# Patient Record
Sex: MALE | Race: WHITE | URBAN - METROPOLITAN AREA
[De-identification: names, ages, dates, MRNs, and addresses within clinical notes are randomized per-mention and may not be internally consistent; named-entity substitution may affect disease eponyms.]

---

## 2018-09-17 ENCOUNTER — OFFICE VISIT (OUTPATIENT)
Dept: OTHER | Facility: OUTPATIENT CENTER | Age: 58
End: 2018-09-17
Payer: COMMERCIAL

## 2018-09-17 ENCOUNTER — TELEPHONE (OUTPATIENT)
Dept: OTHER | Facility: OUTPATIENT CENTER | Age: 58
End: 2018-09-17

## 2018-09-17 DIAGNOSIS — F41.1 GENERALIZED ANXIETY DISORDER: ICD-10-CM

## 2018-09-17 DIAGNOSIS — F10.20 ALCOHOL USE DISORDER, SEVERE, DEPENDENCE (H): ICD-10-CM

## 2018-09-17 DIAGNOSIS — F33.2 SEVERE EPISODE OF RECURRENT MAJOR DEPRESSIVE DISORDER, WITHOUT PSYCHOTIC FEATURES (H): ICD-10-CM

## 2018-09-17 DIAGNOSIS — F64.0 GENDER DYSPHORIA IN ADULT: Primary | ICD-10-CM

## 2018-09-17 ASSESSMENT — ANXIETY QUESTIONNAIRES
7. FEELING AFRAID AS IF SOMETHING AWFUL MIGHT HAPPEN: NOT AT ALL
6. BECOMING EASILY ANNOYED OR IRRITABLE: NEARLY EVERY DAY
1. FEELING NERVOUS, ANXIOUS, OR ON EDGE: NEARLY EVERY DAY
3. WORRYING TOO MUCH ABOUT DIFFERENT THINGS: NEARLY EVERY DAY
GAD7 TOTAL SCORE: 18
5. BEING SO RESTLESS THAT IT IS HARD TO SIT STILL: NEARLY EVERY DAY
2. NOT BEING ABLE TO STOP OR CONTROL WORRYING: NEARLY EVERY DAY

## 2018-09-17 ASSESSMENT — PATIENT HEALTH QUESTIONNAIRE - PHQ9: 5. POOR APPETITE OR OVEREATING: NEARLY EVERY DAY
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
## 2018-09-17 NOTE — MR AVS SNAPSHOT
After Visit Summary   9/17/2018    Kan Williamson    MRN: [id number]           Patient Information     Date Of Birth          1960        Visit Information        Provider Department      9/17/2018 2:30 PM Dimitry Real PhD [facility]        Today's Diagnoses     Gender dysphoria in adult    -  1    Generalized anxiety disorder        Severe episode of recurrent major depressive disorder, without psychotic features (H)        Alcohol use disorder, severe, dependence (H)           Follow-ups after your visit        Your next 10 appointments already scheduled     Oct 01, 2018  3:30 PM CDT   Individual Therapy 53+ minutes with Dimitry Real PhD   [facility] ([facility])    [address]  [address]  [facility] [address]   [phone]            Oct 15, 2018  3:15 PM CDT   Individual Therapy 53+ minutes with Dimitry Rael PhD   [facility] ([facility])    [address]  [address]  [facility] [address]   [phone]            Oct 29, 2018  4:15 PM CDT   Individual Therapy 53+ minutes with Dimitry Real PhD   [facility] ([facility])    [address]  [address]  [facility] [address]   [phone]              Who to contact     Please call your clinic at [phone] to:    Ask questions about your health    Make or cancel appointments    Discuss your medicines    Learn about your test results    Speak to your doctor            Additional Information About Your Visit        MyChart Information     [employer] is an electronic gateway that provides easy, online access to your medical records. With [employer], you can request a clinic appointment, read your test results, renew a prescription or communicate with your care team.     To sign up for [employer] visit the website at [email or web address]   You will be asked to enter the access code listed below, as well as some  personal information. Please follow the directions to create your username and password.     Your access code is: [email or web address]  Expires: 2018 12:21 PM     Your access code will  in 90 days. If you need help or a new code, please contact your [facility] or call [phone] for assistance.        Care EveryWhere ID     This is your Care EveryWhere ID. This could be used by other organizations to access your [facility] medical records  [id number]         Blood Pressure from Last 3 Encounters:   No data found for BP    Weight from Last 3 Encounters:   No data found for Wt              We Performed the Following     C PSYCHIATRIC DIAGNOSTIC EVALUATION        Primary Care Provider    None Specified       No primary provider on file.        Equal Access to Services     ZAIDA ALCANTAR : Suzy Mon, lincoln rahman, gabriel hernandez, tamika de leon . So [facility] [phone].    ATENCIÓN: Si habla español, tiene a rodriguez disposición servicios gratuitos de asistencia lingüística. Llame al [phone].    We comply with applicable federal civil rights laws and [place] laws. We do not discriminate on the basis of race, color, national origin, age, disability, sex, sexual orientation, or gender identity.            Thank you!     Thank you for choosing [facility]  for your care. Our goal is always to provide you with excellent care. Hearing back from our patients is one way we can continue to improve our services. Please take a few minutes to complete the written survey that you may receive in the mail after your visit with us. Thank you!             Your Updated Medication List - Protect others around you: Learn how to safely use, store and throw away your medicines at www.disposemymeds.org.      Notice  As of 2018 11:59 PM    You have not been prescribed any medications.

## 2018-09-18 PROBLEM — F33.2 SEVERE EPISODE OF RECURRENT MAJOR DEPRESSIVE DISORDER (H): Status: ACTIVE | Noted: 2018-09-18

## 2018-09-20 ASSESSMENT — ANXIETY QUESTIONNAIRES: GAD7 TOTAL SCORE: 18

## 2018-09-20 ASSESSMENT — PATIENT HEALTH QUESTIONNAIRE - PHQ9: SUM OF ALL RESPONSES TO PHQ QUESTIONS 1-9: 23

## 2018-09-22 PROBLEM — F64.0 GENDER DYSPHORIA IN ADULT: Status: ACTIVE | Noted: 2018-09-22

## 2018-09-22 PROBLEM — F41.1 GENERALIZED ANXIETY DISORDER: Status: ACTIVE | Noted: 2018-09-22

## 2018-09-22 PROBLEM — F10.20 ALCOHOL USE DISORDER, SEVERE, DEPENDENCE (H): Status: ACTIVE | Noted: 2018-09-22

## 2018-10-01 ENCOUNTER — OFFICE VISIT (OUTPATIENT)
Dept: OTHER | Facility: OUTPATIENT CENTER | Age: 58
End: 2018-10-01
Payer: COMMERCIAL

## 2018-10-01 DIAGNOSIS — F64.0 GENDER DYSPHORIA IN ADULT: Primary | ICD-10-CM

## 2018-10-01 DIAGNOSIS — F10.20 ALCOHOL USE DISORDER, SEVERE, DEPENDENCE (H): ICD-10-CM

## 2018-10-01 DIAGNOSIS — F41.1 GENERALIZED ANXIETY DISORDER: ICD-10-CM

## 2018-10-01 DIAGNOSIS — F33.2 SEVERE EPISODE OF RECURRENT MAJOR DEPRESSIVE DISORDER, WITHOUT PSYCHOTIC FEATURES (H): ICD-10-CM

## 2018-10-01 NOTE — MR AVS SNAPSHOT
After Visit Summary   10/1/2018    Kan Williamson    MRN: [id number]           Patient Information     Date Of Birth          1960        Visit Information        Provider Department      10/1/2018 3:30 PM Dimitry Real PhD [facility]        Today's Diagnoses     Gender dysphoria in adult    -  1    Generalized anxiety disorder        Alcohol use disorder, severe, dependence (H)        Severe episode of recurrent major depressive disorder, without psychotic features (H)           Follow-ups after your visit        Your next 10 appointments already scheduled     Oct 15, 2018  3:15 PM CDT   Individual Therapy 53+ minutes with Dimitry Real PhD   [facility] ([facility])    [address]  [address]  [facility] [address]   [phone]            Oct 29, 2018  4:15 PM CDT   Individual Therapy 53+ minutes with Dimitry Real PhD   [facility] ([facility])    [address]  [address]  [facility] [address]   [phone]            Nov 13, 2018  3:00 PM CST   Individual Therapy 53+ minutes with Dimitry Real PhD   [facility] ([facility])    [address]  [address]  [facility] [address]   [phone]            Nov 27, 2018  3:00 PM CST   Individual Therapy 53+ minutes with Dimitry Real PhD   [facility] ([facility])    [address]  [address]  [facility] [address]   [phone]              Who to contact     Please call your clinic at [phone] to:    Ask questions about your health    Make or cancel appointments    Discuss your medicines    Learn about your test results    Speak to your doctor            Additional Information About Your Visit        [employer] Information     [employer] is an electronic gateway that provides easy, online access to your medical records. With [employer], you can request a clinic appointment, read  your test results, renew a prescription or communicate with your care team.     To sign up for Kanbanizehart visit the website at [email or web address]   You will be asked to enter the access code listed below, as well as some personal information. Please follow the directions to create your username and password.     Your access code is: [email or web address]  Expires: 2018 12:21 PM     Your access code will  in 90 days. If you need help or a new code, please contact your [facility] or call [phone] for assistance.        Care EveryWhere ID     This is your Care EveryWhere ID. This could be used by other organizations to access your [place] [facility] medical records  [id number]         Blood Pressure from Last 3 Encounters:   No data found for BP    Weight from Last 3 Encounters:   No data found for Wt              We Performed the Following     Diagnostic Assessment (complete) [37530]        Primary Care Provider    None Specified       No primary provider on file.        Equal Access to Services     [facility] CenterAM[facility]O : Hadii faustino cadeto Sokatie, waaxda luserena, qaybta kaalmada david, tamika de leon . So [facility] [phone].    ATENCIÓN: Si habla español, tiene a rodriguez disposición servicios gratuitos de asistencia lingüística. Llame al [phone].    We comply with applicable federal civil rights laws and [place] laws. We do not discriminate on the basis of race, color, national origin, age, disability, sex, sexual orientation, or gender identity.            Thank you!     Thank you for choosing [place] [facility]  for your care. Our goal is always to provide you with excellent care. Hearing back from our patients is one way we can continue to improve our services. Please take a few minutes to complete the written survey that you may receive in the mail after your visit with us. Thank you!             Your Updated Medication List - Protect others  around you: Learn how to safely use, store and throw away your medicines at www.disposemymeds.org.      Notice  As of 10/1/2018 11:59 PM    You have not been prescribed any medications.

## 2018-10-01 NOTE — PROGRESS NOTES
"[facility] -  Case Progress Note    Date of Service: Oct 1, 2018  Client Name: Kan Quinn)  YOB: 1960  MRN:  [id number]  Treating Provider: Dimitry Real, PhD  Type of Session: Individual  Present in Session: Patient only  Number of Minutes:  53    Current Symptoms/Status:  The client meets the criteria for Gender Dysphoria (GD), which includes cross gender identification and substantial discomfort with biological sex (anatomical dysphoria) and the expectation of the birth assigend gender role for a period greater than 6 months.  There does not appear to be any evidence that the gender identity concerns are motivated by fetishistic or compulsive characteristics.    The client further meets the criteria for Alcohol Use Disorder (Severe), although she reported significantly less alcohol usage over the last two weeks. Meets criteria for Generalized Anxiety Disorder and Major Depressive Disorder, Recurrent, Severe.    Progress Toward Treatment Goals:   The client continues to show interest and commitment towards receiving services at [facility].   The client is concerned about social transition, particularly in her workplace.   The client identified two treatment goals to work toward.    Intervention: Modality and Response:  The client arrived to session 30 minutes early. Client was oriented in person, place, and time. Client denied suicidal and homicidal ideation during treatment planning. Client's identified treatment goals include: 1) Decrease alcohol and cigarette use, and 2) Explore possibility of hormone replacement therapy (HRT). Client reported consuming four alcoholic drinks since our last session. Client reported smoking one pack of cigarettes every other day. Client remains open and motivated for further decreasing substance use in order to be a more attractive candidate for HRT. Client shared feeling \"at peace\" with her present life but reported occasionally experiencing " "depressive sxs for waiting \"this long\" to begin her transition. Client shared having a supportive social network consisting of members in her LGBTQ IOP group.    Assignment:  -Continue decreasing alcohol and cigarette use.  -Engage in self care.    Interactive Complexity:  NA    Diagnosis:  302.85/F64.0 Gender Dysphoria  303.90/F10.20 Alcohol Use Disorder, Severe  296.33/F33.2 Major Depressive Disorder, Recurrent episode, Severe  300.02/F41.1 Generalized Anxiety Disorder    Plan / Need for Future Services:  Return for therapy in 2 weeks.      Dimitry Real, PhD  "

## 2018-10-02 NOTE — PROGRESS NOTES
I did not personally see the patient but I have reviewed and agree with the assessment and plan as documented in this note.  Hien Bassett, PhD -- Supervisor   Licensed Psychologist

## 2018-10-09 ENCOUNTER — TELEPHONE (OUTPATIENT)
Dept: OTHER | Facility: OUTPATIENT CENTER | Age: 58
End: 2018-10-09

## 2018-10-09 NOTE — TELEPHONE ENCOUNTER
Telephone Intake--TG Adult  Date: 10/9/2018  Client Name:  Kan  Preferred Name: Ashwini   MRN: [id number]  : 1960     Age:  58  Caller Name:   Relationship to Client:  Referral: Prabhakar Real      Presenting Problem / Reason for Assessment   (Clinical History &Symptoms):     MTF  Goals: to have breast, voice, body shape  Family Support: Positive (wife) other family members unaware  Dresses in home female      Clarify their goals/expected services from [facility] medical care--what are they looking for?    Clarify with patient (as necessary) that we are not a primary care clinic and that we do not have a surgeon. We strongly recommend that patients have a primary care doctor for their overall health needs, and we can refer to primary care clinics. We can assist with surgery referrals.  Possible      Length of time experiencing symptoms:  Since the age of 12      Seen Other Providers for Gender (if so, where):    M.D/other.(hormones) :  No  --If yes, request records from the provider at the 1st session.    Therapist:  Prabhakar Real  --if yes, request a referral or summary letter from the therapist at the 1st session..    Psychiatrist:  No  --if yes,, request records from the provider at the 1st session..      Other Medical/Mental Health Diagnoses:  Gender Dysphoria, Open Heart Surgery 2017          If needed, clarify if patient calling from group home or other supervised living arrangement    Note if patient has no mental health or cognitive diagnosis, but phone behavior suggests impairment      Medications:  Aspirin, Atrovastin, Potassium, Plavix, metoprolol, lasix        Primary Care Provider: NO  --If multiple medical conditions, request recent records from primary doctor at the 1st session..      Referral Source:        Follow Up:        Please Verify Registration    If patient has LiveOps or Hotreader, send to .     Paper work sent 10/9  Appt 10/16    Prep Welcome Packet and have patient  come half hour beforehand to fill out forms in packet (health history form, transgender history, Safety Screening, PHQ9, and JANUARY-7.

## 2018-10-16 ENCOUNTER — OFFICE VISIT (OUTPATIENT)
Dept: OTHER | Facility: OUTPATIENT CENTER | Age: 58
End: 2018-10-16
Payer: COMMERCIAL

## 2018-10-16 VITALS
DIASTOLIC BLOOD PRESSURE: 96 MMHG | WEIGHT: 147 LBS | HEART RATE: 101 BPM | SYSTOLIC BLOOD PRESSURE: 148 MMHG | BODY MASS INDEX: 23.07 KG/M2 | HEIGHT: 67 IN

## 2018-10-16 DIAGNOSIS — F17.200 SMOKING: ICD-10-CM

## 2018-10-16 DIAGNOSIS — I50.22 CHRONIC SYSTOLIC HEART FAILURE (H): ICD-10-CM

## 2018-10-16 DIAGNOSIS — I10 BENIGN ESSENTIAL HYPERTENSION: ICD-10-CM

## 2018-10-16 DIAGNOSIS — E78.00 HYPERCHOLESTEREMIA: ICD-10-CM

## 2018-10-16 DIAGNOSIS — I25.810 CORONARY ARTERY DISEASE INVOLVING CORONARY BYPASS GRAFT OF NATIVE HEART WITHOUT ANGINA PECTORIS: ICD-10-CM

## 2018-10-16 NOTE — MR AVS SNAPSHOT
After Visit Summary   10/16/2018    Kan Williamson    MRN: [id number]           Patient Information     Date Of Birth          1960        Visit Information        Provider Department      10/16/2018 11:20 AM Tyler Reyes MD [facility]        Today's Diagnoses     Coronary artery disease involving coronary bypass graft of native heart without angina pectoris        Chronic systolic heart failure (H)        Benign essential hypertension        Hypercholesteremia        Smoking           Follow-ups after your visit        Follow-up notes from your care team     Return if symptoms worsen or fail to improve.      Your next 10 appointments already scheduled     Oct 29, 2018  4:15 PM CDT   Individual Therapy 53+ minutes with Dimitry Real PhD   [facility] ([facility])    [address]  [address]  [facility] [address]   [phone]            Nov 13, 2018  3:00 PM CST   Individual Therapy 53+ minutes with Dimitry Real PhD   [facility] ([facility])    [address]  [address]  [facility] [address]   [phone]            Nov 27, 2018  3:00 PM CST   Individual Therapy 53+ minutes with Dimitry Real PhD   [place] [facility] ([facility])    [address]  [address]  [facility] [address]   [phone]              Who to contact     Please call your clinic at [phone] to:    Ask questions about your health    Make or cancel appointments    Discuss your medicines    Learn about your test results    Speak to your doctor            Additional Information About Your Visit        MyChart Information     [employer] [facility] is an electronic gateway that provides easy, online access to your medical records. With [facility], you can request a clinic appointment, read your test results, renew a prescription or communicate with your care team.     To sign up for [employer] visit the website at  "[email or web address]   You will be asked to enter the access code listed below, as well as some personal information. Please follow the directions to create your username and password.     Your access code is: [email or web address]  Expires: 2018 12:21 PM     Your access code will  in 90 days. If you need help or a new code, please contact your [facility] or call [phone] for assistance.        Care EveryWhere ID     This is your Care EveryWhere ID. This could be used by other organizations to access your [facility] medical records  [id number]        Your Vitals Were     Pulse Height BMI (Body Mass Index)             101 1.702 m (5' 7\") 23.02 kg/m2          Blood Pressure from Last 3 Encounters:   10/16/18 (!) 148/96    Weight from Last 3 Encounters:   10/16/18 66.7 kg (147 lb)              Today, you had the following     No orders found for display       Primary Care Provider    None Specified       No primary provider on file.        Equal Access to Services     [facility] : Hadii faustino villar [place], waaxda luqadaha, qaybta kaalmasharee hernandez, tamika de leon . So [facility] [phone].    ATENCIÓN: Si habla español, tiene a rodriguez disposición servicios gratuitos de asistencia lingüística. Llame al [phone].    We comply with applicable federal civil rights laws and [place] laws. We do not discriminate on the basis of race, color, national origin, age, disability, sex, sexual orientation, or gender identity.            Thank you!     Thank you for choosing [facility] [facility]  for your care. Our goal is always to provide you with excellent care. Hearing back from our patients is one way we can continue to improve our services. Please take a few minutes to complete the written survey that you may receive in the mail after your visit with us. Thank you!             Your Updated Medication List - Protect others around you: Learn how to " safely use, store and throw away your medicines at www.disposemymeds.org.          This list is accurate as of 10/16/18 11:59 PM.  Always use your most recent med list.                   Brand Name Dispense Instructions for use Diagnosis    ATORVASTATIN CALCIUM PO      Take 80 mg by mouth        BABY ASPIRIN PO           CLOPIDOGREL BISULFATE PO      Take 75 mg by mouth        FUROSEMIDE PO      Take 20 mg by mouth        KLOR-CON PO           METOPROLOL TARTRATE PO      Take 25 mg by mouth

## 2018-10-16 NOTE — NURSING NOTE
"Chief Complaint   Patient presents with     Consult     TG       Vitals:    10/16/18 1123   BP: (!) 148/96   Pulse: 101   Weight: 66.7 kg (147 lb)   Height: 1.702 m (5' 7\")       Body mass index is 23.02 kg/(m^2).      Suzette Oropeza CMA    "

## 2018-10-23 PROBLEM — I10 BENIGN ESSENTIAL HYPERTENSION: Status: ACTIVE | Noted: 2018-10-23

## 2018-10-23 PROBLEM — E78.00 HYPERCHOLESTEREMIA: Status: ACTIVE | Noted: 2018-10-23

## 2018-10-23 PROBLEM — I25.810 CORONARY ARTERY DISEASE INVOLVING CORONARY BYPASS GRAFT OF NATIVE HEART WITHOUT ANGINA PECTORIS: Status: ACTIVE | Noted: 2018-10-23

## 2018-10-23 PROBLEM — F17.200 SMOKING: Status: ACTIVE | Noted: 2018-10-23

## 2018-10-23 PROBLEM — I50.22 CHRONIC SYSTOLIC HEART FAILURE (H): Status: ACTIVE | Noted: 2018-10-23
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
## 2018-11-13 ENCOUNTER — OFFICE VISIT (OUTPATIENT)
Dept: OTHER | Facility: OUTPATIENT CENTER | Age: 58
End: 2018-11-13
Payer: COMMERCIAL

## 2018-11-13 DIAGNOSIS — F10.20 ALCOHOL USE DISORDER, SEVERE, DEPENDENCE (H): ICD-10-CM

## 2018-11-13 DIAGNOSIS — F41.1 GENERALIZED ANXIETY DISORDER: ICD-10-CM

## 2018-11-13 DIAGNOSIS — F33.2 SEVERE EPISODE OF RECURRENT MAJOR DEPRESSIVE DISORDER, WITHOUT PSYCHOTIC FEATURES (H): ICD-10-CM

## 2018-11-13 DIAGNOSIS — F64.0 GENDER DYSPHORIA IN ADULT: Primary | ICD-10-CM

## 2018-11-13 NOTE — PROGRESS NOTES
[facility] -  Case Progress Note    Date of Service: 11/13/2018  Client Name: Kan Quinn)  YOB: 1960  MRN:  [id number]  Treating Provider: Dimitry Real, PhD  Type of Session: Individual  Present in Session: Patient only  Number of Minutes:  53    Current Symptoms/Status:  The client meets the criteria for Gender Dysphoria (GD), which includes cross gender identification and substantial discomfort with biological sex (anatomical dysphoria) and the expectation of the birth assigend gender role for a period greater than 6 months.  There does not appear to be any evidence that the gender identity concerns are motivated by fetishistic or compulsive characteristics.    The client further meets the criteria for Alcohol Use Disorder (Severe), although she reported significantly less alcohol usage over the last two weeks. Meets criteria for Generalized Anxiety Disorder and Major Depressive Disorder, Recurrent, Severe.    Progress Toward Treatment Goals:   The client continues to show interest and commitment towards receiving services at [facility].   The client is concerned about social transition, particularly in her workplace.   The client reported receiving social support from network of individuals in recovery.    Intervention: Modality and Response:  The client arrived to session 15 minutes early. She reported having to cancel previous appointments due to her wife's health. Client reported meeting with Dr. Reyes, who she reported referred her to her cardiologist for bloodwork. Client reported continuing to attend [facility] group twice weekly for three hours each day. Client reported work stress has increased since winter weather arrived, and she reported drinking 8oz of beer to cope with work stress two nights ago. Client additionally reported arguing with wife's sister two weeks ago and reported drinking 16oz of beer to cope. Client reported presently smoking five cigarettes  "per day and intends to cut down on daily usage by using nicotine patches. Client reported receiving social support from IOP group members and LGBT AA meeting. Client continues to work toward disclosing transgender identity with others outside of work. She is presently looking to switch jobs next year due to a belief she will not be \"accepted\" at her current job should she come out as transgender.    Assignment:  -Follow up with cardiologist.  -Engage in self care.  -Stay connected with social support.    Interactive Complexity:  NA    Diagnosis:  302.85/F64.0 Gender Dysphoria  303.90/F10.20 Alcohol Use Disorder, Severe  296.33/F33.2 Major Depressive Disorder, Recurrent episode, Severe  300.02/F41.1 Generalized Anxiety Disorder    Plan / Need for Future Services:  Return for therapy in 2 weeks.      Dimitry Real, PhD  "

## 2018-11-13 NOTE — MR AVS SNAPSHOT
After Visit Summary   11/13/2018    Kan Williamson    MRN: [id number]           Patient Information     Date Of Birth          1960        Visit Information        Provider Department      11/13/2018 3:00 PM Dimitry Real PhD [facility]        Today's Diagnoses     Gender dysphoria in adult    -  1    Alcohol use disorder, severe, dependence (H)        Severe episode of recurrent major depressive disorder, without psychotic features (H)        Generalized anxiety disorder           Follow-ups after your visit        Your next 10 appointments already scheduled     Nov 27, 2018  3:00 PM CST   Individual Therapy 53+ minutes with Dimitry Real PhD   [facility] ([facility])    [address]  [address]  [facility] [address]   [phone]            Dec 17, 2018  2:00 PM CST   Individual Therapy 53+ minutes with Dimitry Real PhD   [facility] ([facility])    [address]  [address]  [facility] [address]   [phone]            Dec 31, 2018  3:00 PM CST   Individual Therapy 53+ minutes with Dimitry Real PhD   [facility] ([facility])    [address]  [address]  [facility] [address]   [phone]            Jan 14, 2019  3:00 PM CST   Individual Therapy 53+ minutes with Dimitry Real PhD   [facility] ([facility])    [address]  [address]  [facility] [address]   [phone]            Jan 28, 2019  3:00 PM CST   Individual Therapy 53+ minutes with Dimitry Real PhD   [facility] ([facility])    [address]  [address]  [facility] [address]   [phone]              Who to contact     Please call your clinic at [phone] to:    Ask questions about your health    Make or cancel appointments    Discuss your medicines    Learn about your test results    Speak to your doctor             Additional Information About Your Visit        Care EveryWhere ID     This is your Care EveryWhere ID. This could be used by other organizations to access your [place] medical records  [id number]         Blood Pressure from Last 3 Encounters:   10/16/18 (!) 148/96    Weight from Last 3 Encounters:   10/16/18 66.7 kg (147 lb)              We Performed the Following     Individual Psychotherapy (53+ min) [61323]        Primary Care Provider    None Specified       No primary provider on file.        Equal Access to Services     [facility]: Hadii aad ku hadasho Soomaali, waaxda luqadaha, qaybta kaalmada adeegyada, waxay fernin hayолегn med jeffriescedrickpastor de leon . So [facility] [place] [phone].    ATENCIÓN: Si habla español, tiene a rodriguez disposición servicios gratuitos de asistencia lingüística. [facility] [phone].    We comply with applicable federal civil rights laws and [place] laws. We do not discriminate on the basis of race, color, national origin, age, disability, sex, sexual orientation, or gender identity.            Thank you!     Thank you for choosing [facility] [facility]  for your care. Our goal is always to provide you with excellent care. Hearing back from our patients is one way we can continue to improve our services. Please take a few minutes to complete the written survey that you may receive in the mail after your visit with us. Thank you!             Your Updated Medication List - Protect others around you: Learn how to safely use, store and throw away your medicines at www.disposemymeds.org.          This list is accurate as of 11/13/18 11:59 PM.  Always use your most recent med list.                   Brand Name Dispense Instructions for use Diagnosis    ATORVASTATIN CALCIUM PO      Take 80 mg by mouth        BABY ASPIRIN PO           CLOPIDOGREL BISULFATE PO      Take 75 mg by mouth        FUROSEMIDE PO      Take 20 mg by mouth        KLOR-CON PO           METOPROLOL TARTRATE PO      Take 25 mg  by mouth

## 2018-11-27 ENCOUNTER — TELEPHONE (OUTPATIENT)
Dept: OTHER | Facility: OUTPATIENT CENTER | Age: 58
End: 2018-11-27

## 2018-12-17 ENCOUNTER — TELEPHONE (OUTPATIENT)
Dept: OTHER | Facility: OUTPATIENT CENTER | Age: 58
End: 2018-12-17